# Patient Record
Sex: MALE | Race: BLACK OR AFRICAN AMERICAN
[De-identification: names, ages, dates, MRNs, and addresses within clinical notes are randomized per-mention and may not be internally consistent; named-entity substitution may affect disease eponyms.]

---

## 2018-06-13 NOTE — HISTORY & PHYSICAL PRE-OP
General Information and HPI
MD Statement:
I have seen and personally examined CARSON ROSALES and documented this
H&P.
 
The patient is a 62 year old M who presented with a patient stated chief 
complaint of bilateral hip pain radiating to bilateral anterior thighs with 
ambulation.
 
Source of Information: patient, old records
Exam Limitations: no limitations
History of Present Illness:
  Carson is a 62-year-old gentleman who has been complaining of 
progressively worsening bilateral hip pain that radiates to his anterior thighs 
bilaterally.  He states the pain is worse with ambulation with classic symptoms 
of neurogenic claudication.  He denies any significant low back pain nor any 
discomfort beyond his knees.  He does admit to weakness in bilateral lower 
extremities with quad buckling but no obvious numbness or tingling in his lower 
extremities.  He also denies any bowel or bladder involvement.  Carson 
describes his pain as a 7/10 in intensity with ambulation which is relieved with
rest.  Carson has tried conservative measures without relief.  Given his MRI
findings of L2-3 spinal stenosis with suspected pseudoarthrosis at the L4-5 
level, and his failure to respond to conservative measures, he wants nothing 
more to do with nonsurgical treatment.  He is being consented for a revision 
posterior lumbar decompression and fusion L2-S1 with instrumentation and iliac 
crest bone grafting on 2018.
 
Allergies/Medications
Allergies:
Coded Allergies:
No Known Allergies (18)
 
Home Med list
Acetaminophen (Tylenol Extra Strength) 500 MG TABLET   1 TAB PO TID PRN pain  (
Reported)
Allopurinol 300 MG TAB   1 TAB PO DAILY UNKNOWN  (Reported)
Aspirin (Ecotrin) 81 MG TABLET.DR   1 TAB PO DAILY BLOOD THINNER  (Reported)
Atenolol 50 MG TABLET   1 TAB PO DAILY BLOOD PRESSURE  (Reported)
Hydrochlorothiazide (Hydrodiuril 25 MG Tab) 25 MG TABLET   1 TAB PO DAILY BLOOD 
PRESSURE  (Reported)
Losartan (Cozaar) 100 MG TABLET   1 TAB PO DAILY BLOOD PRESSURE  (Reported)
Nifedipine (Adalat Cc) 30 MG TABLET.ER   1 TAB PO DAILY UNKNOWN  (Reported)
Sitagliptin Phos/Metformin HCl (Janumet  MG Tablet) 50 MG-500 MG TABLET   
1 TAB PO BID diabetes  (Reported)
 
Compliance With Home Meds: GOOD
 
Past History
 
Medical History
Blood Transfusion Hx: No
Neurological: NONE
EENT: NONE
Cardiovascular: hypertension
Respiratory: NONE
Gastrointestinal: NONE
Hepatic: NONE
Renal: NONE
Musculoskeletal: chronic back pain, disk herniation, degen joint disease, gout, 
osteoarthritis, sciatica, spinal stenosis
Psychiatric: NONE
Endocrine: diabetes
Blood Disorders: NONE
Cancer(s): NONE
GYN/Reproductive: NONE
History of MRSA: No
History of VRE: No
History of CDIFF: No
Isolation History: Standard
 
Surgical History
Pertinent Surgical History: laminectomy, spinal fusion, s/p PLDF L3-S1 with 
Instr. , s/p right shoulder arthroscopy , s/p colonoscopy
 
Past Family/Social History
 
Family History
Relations & Conditions if any
MOTHER (Alzheimer's DiseaseHypertensionDiabetes Mellitus). Age 85.
FATHER, , Age 74; Cause: Colon cancer.
SISTER (Mini Strokes(TIA)). Age 69.
 
 
Psychosocial History
Where Do You Live? Home
Who Do You Live With? parent
Services at Home None
Primary Language: English
Smoking Status: Former Smoker (quit 30 years ago)
ETOH Use: 1-2 scotch drinks a week
Illicit Drug Use: denies illicit drug use
Other Social History:
Single with no children.
 
Employment History
Employment: Employed
Profession/Employer: 
 
Review of Systems
Review of Systems:
Remarkable for the above complaints.
 
Exam & Diagnostic Data
Last 24 Hrs of Vital Signs/I&O
Height: 5' 9"
Weight: 280 lbs.
 
Physical Exam
General Appearance Alert, Oriented X3, Cooperative, No Acute Distress
Skin No Rashes, No Breakdown, No Significant Lesion
HEENT Atraumatic, PERRLA, EOMI, Mucous Membr. moist/pink
Neck Supple, No JVD, No thryomegaly, +2 Carotid Pulse wo Bruit
Lymphatic Cervical nl
Cardiovascular Regular Rate, Normal S1, Normal S2, No Murmurs
Lungs Clear to Auscultation
Abdomen Normal Bowel Sounds, Soft, No Tenderness
Neurological +2 SINAN. ACHILLES REFLEXES, ABSENT SINAN. PATELLAR REFLEXES, WEAK +3 
SINAN. QUADS, ATROPHY OF SINAN. QUADS
Extremities No Edema, Normal Pulses
Vascular Normal Pulses
 
Assessment/Plan
Assessment/Plan:
Assessment:  Severe spinal stenosis at L2-3 and pseudoarthrosis at L4-5. 
 
Plan: Carson is scheduled for a revision posterior lumbar decompression and 
fusion L2-S1 with instrumentation and iliac crest bone grafting on 2018.  
We discussed the procedure in full detail, as well as the pre-and postoperative 
course, follow-up care, and anticipated recovery.  We also discussed the do's 
and don'ts and postoperative discharge instructions.  We discussed the benefits,
alternatives, and risks, not to exclude, death, paralysis, infection, bleeding, 
continued pain, failure of the surgery, need for future surgery, DVT, vascular 
injury, CSF leak, etc., and given these risks, he still wishes to proceed.  He 
is scheduled to follow-up with Dr. Panchito Key for his preoperative 
clearance.  Any changes in this patient's plan is based on this patient's 
outpatient clinical presentation.
 
Copies To:
Alvin WORRELL,Raphael
 
Attending MD Review Statement
 
Attending Statement
Attending MD Statement: examined this patient, discuss w/resident/PA/NP, agreed 
w/resident/PA/NP, reviewed images

## 2018-06-28 ENCOUNTER — HOSPITAL ENCOUNTER (INPATIENT)
Dept: HOSPITAL 68 - SDA | Age: 63
LOS: 2 days | DRG: 460 | End: 2018-06-30
Attending: ORTHOPAEDIC SURGERY | Admitting: ORTHOPAEDIC SURGERY
Payer: COMMERCIAL

## 2018-06-28 VITALS — HEIGHT: 69 IN | BODY MASS INDEX: 41.32 KG/M2 | WEIGHT: 279 LBS

## 2018-06-28 VITALS — SYSTOLIC BLOOD PRESSURE: 136 MMHG | DIASTOLIC BLOOD PRESSURE: 90 MMHG

## 2018-06-28 VITALS — DIASTOLIC BLOOD PRESSURE: 90 MMHG | SYSTOLIC BLOOD PRESSURE: 150 MMHG

## 2018-06-28 DIAGNOSIS — Z87.891: ICD-10-CM

## 2018-06-28 DIAGNOSIS — Z79.84: ICD-10-CM

## 2018-06-28 DIAGNOSIS — M10.9: ICD-10-CM

## 2018-06-28 DIAGNOSIS — M96.0: ICD-10-CM
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
## 2018-06-28 DIAGNOSIS — J45.909: ICD-10-CM

## 2018-06-28 DIAGNOSIS — E11.9: ICD-10-CM

## 2018-06-28 DIAGNOSIS — M48.062: Primary | ICD-10-CM

## 2018-06-28 DIAGNOSIS — I10: ICD-10-CM

## 2018-06-28 LAB
ABSOLUTE GRANULOCYTE CT: 8.2 /CUMM (ref 1.4–6.5)
BASOPHILS # BLD: 0 /CUMM (ref 0–0.2)
BASOPHILS NFR BLD: 0.5 % (ref 0–2)
EOSINOPHIL # BLD: 0 /CUMM (ref 0–0.7)
EOSINOPHIL NFR BLD: 0.4 % (ref 0–5)
ERYTHROCYTE [DISTWIDTH] IN BLOOD BY AUTOMATED COUNT: 14.8 % (ref 11.5–14.5)
GRANULOCYTES NFR BLD: 82.5 % (ref 42.2–75.2)
HCT VFR BLD CALC: 42.8 % (ref 42–52)
LYMPHOCYTES # BLD: 1.4 /CUMM (ref 1.2–3.4)
MCH RBC QN AUTO: 31.1 PG (ref 27–31)
MCHC RBC AUTO-ENTMCNC: 34.6 G/DL (ref 33–37)
MCV RBC AUTO: 90 FL (ref 80–94)
MONOCYTES # BLD: 0.3 /CUMM (ref 0.1–0.6)
PLATELET # BLD: 237 /CUMM (ref 130–400)
PMV BLD AUTO: 9.2 FL (ref 7.4–10.4)
RED BLOOD CELL CT: 4.75 /CUMM (ref 4.7–6.1)
WBC # BLD AUTO: 10 /CUMM (ref 4.8–10.8)

## 2018-06-28 PROCEDURE — 0SG1071 FUSION OF 2 OR MORE LUMBAR VERTEBRAL JOINTS WITH AUTOLOGOUS TISSUE SUBSTITUTE, POSTERIOR APPROACH, POSTERIOR COLUMN, OPEN APPROACH: ICD-10-PCS | Performed by: ORTHOPAEDIC SURGERY

## 2018-06-28 PROCEDURE — 4A11X4G MONITORING OF PERIPHERAL NERVOUS ELECTRICAL ACTIVITY, INTRAOPERATIVE, EXTERNAL APPROACH: ICD-10-PCS | Performed by: ORTHOPAEDIC SURGERY

## 2018-06-28 PROCEDURE — C1713 ANCHOR/SCREW BN/BN,TIS/BN: HCPCS

## 2018-06-28 PROCEDURE — 0QP004Z REMOVAL OF INTERNAL FIXATION DEVICE FROM LUMBAR VERTEBRA, OPEN APPROACH: ICD-10-PCS | Performed by: ORTHOPAEDIC SURGERY

## 2018-06-28 PROCEDURE — 0SG3071 FUSION OF LUMBOSACRAL JOINT WITH AUTOLOGOUS TISSUE SUBSTITUTE, POSTERIOR APPROACH, POSTERIOR COLUMN, OPEN APPROACH: ICD-10-PCS | Performed by: ORTHOPAEDIC SURGERY

## 2018-06-28 NOTE — PN- ORTHOPEDIC
Subjective
Subjective:
patient feels well without complaints postop, pain controlled
 
Objective
Vital Signs and I&Os
Vital Signs
 
 
Date Time Temp Pulse Resp B/P B/P Pulse O2 O2 Flow FiO2
 
     Mean Ox Delivery Rate 
 
06/28 1313      92 Room Air Room Air 
 
06/28 1308 97.8 63 16 150/90  92 Room Air Room Air 
 
 
 Intake & Output
 
 
 06/28 1600 06/28 0800 06/28 0000 06/27 1600 06/27 0800 06/27 0000
 
Intake Total      
 
Output Total 850     
 
Balance -850     
 
       
 
Output, Urine 850     
 
Patient 279 lb   280 lb  
 
Weight      
 
Weight Reported by Patient     
 
Measurement      
 
Method      
 
 
lumbar spine - 
mild drinage in dressings -reinforced
motor 5/5 all muscle groups
sensory intact, distal pulses intact
calves soft bilaterally and distal pulses intact
 
Assessment/Plan
Assessment/Plan
63 y/o male with loose hardware and unonunion s/p
Lumbosacral inspection of fusion mass level of segmental hardware bilaterally L3
4 5 S1.  Laminectomies L3-S1.  Pedicle screw instrumentation L3-S1.  Fusion with
local autologous bone graft L3 to S1.
EBL @ 200cc
sitting in bed -drainage in into dressings -reinforced by nursing -appears to be
minimal and not worrisome
tolarating POs -had a full dinner without nausea or vomiting
voided without difficulty
OOB with nursing ambulation with assitance
 
 
Core Measures
 
Venous Thromboembolism
VTE Risk Factors No risk factors
No Mechanical VTE Prophylaxis d/t Surgical Contraindication
No VTE Pharm Prophylaxis d/t NA PharmProphylax ordered

## 2018-06-28 NOTE — OPERATIVE REPORT
Operative/Inv Procedure Report
Surgery Date: 06/28/18
Name of Procedure:
Lumbosacral inspection of fusion mass level of segmental hardware bilaterally L3
4 5 S1.  Laminectomies L3 4 4551.  Pedicle screw instrumentation L3 4 5 S1.  Use
of fluoroscopy.  Fusion with local autologous bone graft L3 to S1.
Pre-Operative Diagnosis:
Loose hardware nonunion lumbosacral spine
Post-Operative Diagnosis:
Nonunion L5-S1.
Estimated Blood Loss: 200
Surgeon/Assistant:
Alvin WORRELL,Raphael ACEVEDO
 
Anesthesia: general endotracheal tube
Monitors:
Neuro
 
Operative/Procedure Note
Note:
After adequate general anesthesia was achieved the patient was placed in the 
prone position.  Previous incision was entered sharply with the scalpel in the 
midline.  Dissection was carried over the dorsal elements with electrocautery 
and the deep retractors were placed.  The hardware was examined as was the 
fusion and there is evidence of failed hardware at a nonunion at L5-S1 
bilaterally.  The hardware was removed.  One screw was still found to be 
stripped at L5 on the right and was left in place.  The osteotome and 
instrumentation was used to create laminectomies removing some of the ostial 
calcified bone over the laminotomy the right side.  The bone was stored for 
fusion.  The screw holes were evaluated the whole at L3 on the left was found to
be soft and not used for instrumentation and screws were placed on the right 
side at L3 bilaterally at L4 on the left side at L5 and bilaterally at S1.  
Excellent purchase was achieved and all screws placed.  The rods were contoured 
into position and torqued tightened into the to a section of the screws.  The 
wound was copiously irrigated the bone graft was packed over the decorticated 
area from L3 to S1 laterally.  The wound was again irrigated a closure of the 
lumbar dorsal fashion subcutaneous tissue was performed with absorbable suture 
the skin was closed with staples.  Sterile dressings were applied.  [facility]

## 2018-06-28 NOTE — PATIENT DISCHARGE INSTRUCTIONS
Acute Coronary Syndrome
 
Inclusion Criteria
At DC or during hospital stay patient has or had the following:
ACS DIAGNOSIS No
 
Discharge Core Measures
Meds if any: Prescribed or Continued at Discharge
Meds if any: NOT Prescribed or Continued at Discharge
 
Congestive Heart Failure
 
Inclusion Criteria
At DC or during hospital stay patient has or had the following:
CHF DIAGNOSIS No
 
Discharge Core Measures
Meds if any: Prescribed or Continued at Discharge
Meds if any: NOT Prescribed or Continued at Discharge
 
Cerebrovascular accident
 
Inclusion Criteria
At DC or during hospital stay patient has or had the following:
CVA/TIA Diagnosis No
 
Discharge Core Measures
Meds if any: Prescribed or Continued at Discharge
Meds if any: NOT Prescribed or Continued at Discharge
 
Venous thromboembolism
 
Inclusion Criteria
VTE Diagnosis No
VTE Type NONE
VTE Confirmed by (Test) NONE
 
Discharge Core Measures
- Per Current guidelines, there needs to be overlap
- treatment for the first 5 days of Warfarin therapy.
- If discharged on Warfarin prior to 5 days of
- overlap therapy, the patient will need to be
- assessed for post discharge needs including
- *Post discharge parental anticoagulation
- *Warfarin and/or parental anticoagulation education
- *Follow up date to check INR post discharge
At least 5 days overlap therapy as Inpatient No
Meds if any: Prescribed or Continued at Discharge
Note: Overlap Therapy is Warfarin and Anticoagulant
Meds if any: NOT Prescribed or Continued at Discharge

## 2018-06-28 NOTE — CONS- MEDICAL
Angelia King MD 18 1416:
General Information and HPI
 
Consulting Request
Date of Consult: 18
Requested By:
Raphael Esquivel MD
 
Reason for Consult:
HTN and DM management
Source of Information: patient, old records
Exam Limitations: no limitations
History of Present Illness:
Patient is a 63 YO M with PMH significant for HTN, DM, spinal stenosis requiring
surgical intervention presented electively for lumbosacral laminectomy with 
pedicle screw instrumentation followed by local autologous bone grafting. 
 
Medicine is consulted for management of diabetes and hypertension 
postoperatively.  Reportedly never had any cardiac problems.
He quit smoking, socially drinks twice a week, no drugs of abuse
 
He had his shoulder surgery in 2016.
 
 
 
Allergies/Medications
Allergies:
Coded Allergies:
No Known Allergies (18)
 
Home Med List:
Acetaminophen (Tylenol Extra Strength) 500 MG TABLET   1 TAB PO TID PRN pain  (
Reported)
Acetaminophen (Tylenol Extra Strength) 500 MG TABLET   1 TAB PO TID PRN TEMP>101
Allopurinol 300 MG TAB   1 TAB PO DAILY UNKNOWN  (Reported)
Aspirin (Ecotrin) 81 MG TABLET.DR   1 TAB PO DAILY BLOOD THINNER  (Reported)
Atenolol 50 MG TABLET   1 TAB PO DAILY BLOOD PRESSURE  (Reported)
Bisacodyl (Dulcolax) 10 MG SUPP.RECT   1 SUP RC DAILY PRN CONSTIPATION
Calcium Carbonate/Vitamin D3 (Os-Ed 500+D3 Caplet) 500 MG-200 TABLET   1 TAB PO
BID BONE HEALTH
Cholecalciferol (Vitamin D3) 1,000 UNIT TABLET   1 TAB PO DAILY BONE HEALTH
Docusate Sodium (Colace) 100 MG CAPSULE   1 CAP PO BID PRN CONSTIPATION
Hydrochlorothiazide (Hydrodiuril 25 MG Tab) 25 MG TABLET   1 TAB PO DAILY BLOOD 
PRESSURE  (Reported)
Losartan (Cozaar) 100 MG TABLET   1 TAB PO DAILY BLOOD PRESSURE  (Reported)
Magnesium Hydroxide (Milk Of Magnesia) 400 MG/5 ML ORAL.SUSP   5 ML PO Q8P PRN 
CONSTIPATION
Metformin HCl 500 MG TABLET   1 TAB PO BID DIABETES  (Reported)
Multiple Vitamin (Multivitamins) 1 EACH TABLET   1 TAB PO DAILY GENERAL HEALTH
Nifedipine (Adalat Cc) 30 MG TABLET.ER   1 TAB PO DAILY UNKNOWN  (Reported)
Oxycodone HCl/Acetaminophen (Percocet 5-325 MG Tablet) 5 MG-325 MG TABLET   1-2 
TAB PO Q4-6 PRN PAIN
Sitagliptin Phos/Metformin HCl (Janumet  MG Tablet) 50 MG-500 MG TABLET   
1 TAB PO BID diabetes  (Reported)
 
Current Medications:
 Current Medications
 
 
  Sig/Kaden Start time  Last
 
Medication Dose Route Stop Time Status Admin
 
Acetaminophen 650 MG Q4P PRN  1145 AC 
 
  PO   
 
Acetaminophen 1,000 MG .STK-MED ONE  0709 DC 
 
  IV  0710  
 
Allopurinol 300 MG DAILY  09 AC 
 
  PO   
 
Aspirin Buffered 81 MG DAILY  09 AC 
 
  PO   
 
Atenolol 50 MG DAILY  09 AC 
 
  PO   
 
Bisacodyl 10 MG DAILY AS NEEDED PRN  1145 AC 
 
  MA   
 
Calcium 600 MG BID  2100 AC 
 
  PO   
 
Cefazolin Sodium 1,000 MG IQ8  1600 AC 
 
  IV  0801  1637
 
Cefazolin Sodium 2,000 MG ONCE  0000 NR 
 
  IV  2359  
 
Cholecalciferol 1,000 IU DAILY  09 AC 
 
  PO   
 
Docusate Sodium 100 MG TID  1400 AC 
 
  PO   1447
 
Fentanyl Citrate 250 MCG .STK-MED ONE  0708 DC 
 
  IM  0709  
 
Hydrochlorothiazide 25 MG DAILY  09 AC 
 
  PO   
 
Hydromorphone HCl 2 MG .STK-MED ONE  0707 DC 
 
  IM  0708  
 
Insulin Aspart 0 AT BEDTIME  2100 AC 
 
  SC   
 
Insulin Aspart 0 TIDAC  1200 AC 
 
  SC   1637
 
Lactated Ringer's 1,000 ML Q10H  1145 AC 
 
  IV   1446
 
Losartan Potassium 100 MG DAILY  09 AC 
 
  PO   
 
Magnesium Hydroxide 30 ML Q8P PRN  1145 AC 
 
  PO   
 
Metformin HCl 500 MG BID  2100 AC 
 
  PO   
 
Midazolam HCl 2 MG .STK-MED ONE  0708 DC 
 
  IM  0709  
 
Morphine Sulfate 1 MG Q3P PRN  1145 AC 
 
  IV   
 
Multivitamins 1 TAB DAILY  09 AC 
 
  PO   
 
Nifedipine 30 MG DAILY  0900 AC 
 
  PO   
 
Ondansetron HCl 4 MG Q6P PRN  1145 AC 
 
  IV   
 
Oxycodone/ 1 TAB Q4P PRN  1145 AC 
 
Acetaminophen  PO   
 
Oxycodone/ 2 TAB Q4P PRN  1145 AC 
 
Acetaminophen  PO   1637
 
Remifentanil 2 MG .STK-MED ONE  0708 DC 
 
  IV  0709  
 
Sitagliptin Phosphate 50 MG BID  2100 AC 
 
  PO   
 
Trimethobenzamide HCl 200 MG Q6P PRN  1145 AC 
 
  IM   
 
 
 
 
Review of Systems
 
Review of Systems
Constitutional:
Reports: see HPI. 
 
Past History
 
Medical History
Blood Transfusion Hx: No
Neurological: NONE
EENT: NONE
Cardiovascular: hypertension
Respiratory: NONE
Gastrointestinal: NONE
Hepatic: NONE
Renal: NONE
Musculoskeletal: chronic back pain, disk herniation, degen joint disease, gout, 
osteoarthritis, sciatica, spinal stenosis
Psychiatric: NONE
Endocrine: diabetes
Blood Disorders: NONE
Cancer(s): NONE
GYN/Reproductive: NONE
 
Surgical History
Surgical History: laminectomy, spinal fusion, s/p PLDF L3-S1 with Instr.  s/
p right shoulder arthroscopy 2016 s/p colonoscopy
 
Family History
Relations & Conditions If Any:
MOTHER (Alzheimer's DiseaseHypertensionDiabetes Mellitus). Age 85.
FATHER, , Age 74; Cause: Colon cancer.
SISTER (Mini Strokes(TIA)). Age 69.
 
 
Psychosocial History
Where Do You Live? Home
Who Do You Live With? parent
Services at Home: None
Primary Language: English
Smoking Status: Never Smoked (quit 30 years ago)
ETOH Use: 1-2 scotch drinks a week
Illicit Drug Use: denies illicit drug use
Other Social History:
Single with no children.
 
Functional Ability
ADLs
Independent: dressing, eating, toileting, bathing. 
IADLs
Independent: shopping, housework, finances, food prep, telephone, transportation
, medication admin. 
 
Employment History
Employment: Employed
Profession/Employer: 
 
Exam & Diagnostic Data
Last 24 Hrs of Vital Signs/I&O
 Vital Signs
 
 
Date Time Temp Pulse Resp B/P B/P Pulse O2 O2 Flow FiO2
 
     Mean Ox Delivery Rate 
 
 1806       Room Air  
 
 1313      92 Room Air Room Air 
 
 1308 97.8 63 16 150/90  92 Room Air Room Air 
 
 
 Intake & Output
 
 
  1600  0800  0000
 
Intake Total   
 
Output Total 850  
 
Balance -850  
 
    
 
Output, Urine 850  
 
Patient 126.552 kg  
 
Weight   
 
Weight Reported by Patient  
 
Measurement   
 
Method   
 
 
 
 
Physical Exam
General Appearance: well developed/nourished, no apparent distress, alert, awake
Head: atraumatic, normal appearance
Eyes:
Bilateral: normal appearance, PERRL, EOMI. 
Ears, Nose, Throat: normal pharynx, normal ENT inspection
Neck: normal inspection, supple
Cardiovascular: regular rate/rhythm, normal heart sounds
Peripheral Pulses:
2+ radial (R), 2+ radial (L)
Gastrointestinal: normal bowel sounds, soft, non-tender
Extremities: normal inspection, normal capillary refill
Neurologic/Psych: awake, alert, oriented x 3, normal mood/affect
Cranial Nerves: normal hearing, normal speech, PERRL
Last 24 Hrs of Labs/Olu:
 Laboratory Tests
 
18 1200:
CBC w Diff NO MAN DIFF REQ, RBC 4.75, MCV 90.0, MCH 31.1  H, MCHC 34.6, RDW 14.8
 H, MPV 9.2, Gran % 82.5  H, Lymphocytes % 14.0  L, Monocytes % 2.6, Eosinophils
% 0.4, Basophils % 0.5, Absolute Granulocytes 8.2  H, Absolute Lymphocytes 1.4, 
Absolute Monocytes 0.3, Absolute Eosinophils 0, Absolute Basophils 0
 
 
Assessment/Plan
Assessment/Plan
Patient is a 62-year-old male with history of diabetes, hypertension presented 
to [place] electively for laminectomy/bone grafting of lumbar spinal stenosis.  
He had a history of diabetes and on oral hypoglycemics started 10/metformin 50/
500 twice a day.  He takes for antihypertensives including no rales, hype 
hydrochlorothiazide, losartan and nifedipine.  His aspirin was on hold prior to 
surgery.
 
Problem list
1.  Status post Lumbosacral inspection of fusion mass level of segmental 
hardware bilaterally L3 4 5 S1.  Laminectomies L3-S1.  Pedicle screw 
instrumentation L3-S1.  Fusion with local autologous bone graft L3 to S1 - POD 0
2.  Diabetes mellitus on oral hypoglycemics
3.  Hypertension
4.  Gout
 
Plan
Continue postoperative care per surgery
Please avoid oral hypoglycemic agents while in the hospital
Start low-dose insulin sliding scale with meals, Accu-Cheks TIDAC/HS, Levemer 3 
units twice a day
Continue antihypertensives including atenolol, losartan, nifedipine HCTZ for now
Pain management per surgery
DVT prophylaxis all the time
 
Problem List:
 1. Gout
 
 2. Diabetes
 
 3. Hypertension
 
 
Consult Acknowledgment
- Thank you for your consult request.
 
 
Pedro Robin MD 18 [id number]:
Assessment/Plan
 
Consult Acknowledgment
- Thank you for your consult request.
 
Attending MD Review Statement
 
Attending Statement
Attending MD Statement: examined this patient, discuss w/resident/PA/NP, agreed 
w/resident/PA/NP, discussed with family, reviewed EMR data (avail), amended to 
note
Attending Assessment/Plan:
The patient is a 63 yo male with h/o HTN, DM2, asthma, and spinal stenosis and h
/o prior lumbar surgery who presented with h/o increasing back pain for elective
lumbosacral laminectomy. His BP and diabetes have been stable. He did well with 
surgery. The patient is feeling well post op other than typical LBP. No dyspnea,
chest pain or GI symptoms.
 
Physical Exam;
VS: T 97.8, P 63, R 16, /80, PO 92%
HEENT: eyes- PERRLA, EOMI
           steven- moist mucosa
Neck: no bruits/JVD
Chest: clear
Cor: RRR nl S1, S2 w/o murm
Abd: BS+, soft, NT
Ext: no edema, pulses 2+
Neuro: alert & oriented, non-focal exam
 
Labs/Tests- as above.
 
Impression/Plan:
#S/P Lumbosacral Fusion- did well with surgery.
Plan: As per surgery.
 
#HTN- BP stable.
Plan: Continue Nifedipine, HTCZ, Atenolol, & Losartan.
 
#DM2- blood sugars being followed.
Plan: Agree with sliding scale overage at present.
        Continuing oral agents.

## 2018-06-29 VITALS — SYSTOLIC BLOOD PRESSURE: 141 MMHG | DIASTOLIC BLOOD PRESSURE: 82 MMHG

## 2018-06-29 VITALS — SYSTOLIC BLOOD PRESSURE: 130 MMHG | DIASTOLIC BLOOD PRESSURE: 80 MMHG

## 2018-06-29 VITALS — DIASTOLIC BLOOD PRESSURE: 78 MMHG | SYSTOLIC BLOOD PRESSURE: 138 MMHG

## 2018-06-29 LAB
ABSOLUTE GRANULOCYTE CT: 6.5 /CUMM (ref 1.4–6.5)
BASOPHILS # BLD: 0 /CUMM (ref 0–0.2)
BASOPHILS NFR BLD: 0.3 % (ref 0–2)
EOSINOPHIL # BLD: 0.1 /CUMM (ref 0–0.7)
EOSINOPHIL NFR BLD: 0.8 % (ref 0–5)
ERYTHROCYTE [DISTWIDTH] IN BLOOD BY AUTOMATED COUNT: 14.8 % (ref 11.5–14.5)
GRANULOCYTES NFR BLD: 66.9 % (ref 42.2–75.2)
HCT VFR BLD CALC: 40.1 % (ref 42–52)
LYMPHOCYTES # BLD: 2 /CUMM (ref 1.2–3.4)
MCH RBC QN AUTO: 30.9 PG (ref 27–31)
MCHC RBC AUTO-ENTMCNC: 34 G/DL (ref 33–37)
MCV RBC AUTO: 90.7 FL (ref 80–94)
MONOCYTES # BLD: 1.2 /CUMM (ref 0.1–0.6)
PLATELET # BLD: 274 /CUMM (ref 130–400)
PMV BLD AUTO: 8.5 FL (ref 7.4–10.4)
RED BLOOD CELL CT: 4.43 /CUMM (ref 4.7–6.1)
WBC # BLD AUTO: 9.8 /CUMM (ref 4.8–10.8)

## 2018-06-29 NOTE — PN- MEDICINE CONSULT
Fernando WORRELL,Angelia 06/29/18 1046:
Assessment/PlanMedical Consult
 
Assessment/Plan
Assessment:
Patient is a 62-year-old male with history of diabetes, hypertension presented 
to [facility] electively for laminectomy/bone grafting of lumbar spinal stenosis.  
He had a history of diabetes and on oral hypoglycemics started 10/metformin 50/
500 twice a day.  He takes for antihypertensives including no rales, hype 
hydrochlorothiazide, losartan and nifedipine.  His aspirin was on hold prior to 
surgery.
 
Problem list
1.  Status post Lumbosacral inspection of fusion mass level of segmental 
hardware bilaterally L3 4 5 S1.  Laminectomies L3-S1.  Pedicle screw 
instrumentation L3-S1.  Fusion with local autologous bone graft L3 to S1 - POD 1
2.  Diabetes mellitus on oral hypoglycemics
3.  Hypertension
4.  Gout
 
Plan
Continue postoperative care per surgery
Please avoid oral hypoglycemic agents while in the hospital
Start low-dose insulin sliding scale with meals, Accu-Cheks TIDAC/HS, Levemer 3 
units twice a day
Continue antihypertensives including atenolol, losartan, nifedipine HCTZ for now
Pain management per surgery
DVT prophylaxis all the time
Plan:
AS ABOVE
Problem List:
 1. Diabetes
 
 2. Hypertension
 
 3. Gout
 
 
Subjective
Subjective:
seen at bedside. 
Appears stable. No overnight issues. 
 
Review of Systems
Constitutional:
Reports: see HPI. 
 
Objective
Last 24 Hrs of Vital Signs/I&O
 Vital Signs
 
 
Date Time Temp Pulse Resp B/P B/P Pulse O2 O2 Flow FiO2
 
     Mean Ox Delivery Rate 
 
06/29 0852  64  148/98     
 
06/29 0645 97.6 56 16 130/80  96   
 
06/28 2129 98.5 62 18 136/90  95   
 
06/28 1806       Room Air  
 
06/28 1600       Room Air  
 
06/28 1313      92 Room Air Room Air 
 
06/28 1308 97.8 63 16 150/90  92 Room Air Room Air 
 
 
 Intake & Output
 
 
 06/29 1600 06/29 0800 06/29 0000
 
Intake Total   2130
 
Output Total  1325 400
 
Balance  -1325 1730
 
    
 
Intake, IV   800
 
Intake, Oral   1330
 
Output, Urine  1325 400
 
 
 
 
Physical Exam
General Appearance: well developed/nourished, no apparent distress, alert, awake
, comfortable
Head: atraumatic, normal appearance
Ears, Nose, Throat: normal pharynx, normal ENT inspection
Neck: normal inspection, supple
Cardiovascular: regular rate/rhythm, normal S1, S2
Respiratory: normal breath sounds, chest non-tender, no respiratory distress
Peripheral Pulses:
2+ radial (R), 2+ radial (L)
Abdomen: normal bowel sounds, soft, non-tender
Current Medications:
 Current Medications
 
 
  Sig/Kaden Start time  Last
 
Medication Dose Route Stop Time Status Admin
 
Acetaminophen 650 MG Q4P PRN 06/28 1145 AC 
 
  PO   
 
Allopurinol 300 MG DAILY 06/29 0900 AC 06/29
 
  PO   0850
 
Aspirin Buffered 81 MG DAILY 06/29 0900 AC 06/29
 
  PO   0849
 
Atenolol 50 MG DAILY 06/29 0900 AC 06/29
 
  PO   0850
 
Bisacodyl 10 MG DAILY AS NEEDED PRN 06/28 1145 AC 
 
  ID   
 
Calcium 600 MG BID 06/28 2100 AC 06/29
 
  PO   0849
 
Cefazolin Sodium 1,000 MG IQ8 06/28 1600 DC 06/29
 
  IV 06/29 0801  0848
 
Cefazolin Sodium 2,000 MG ONCE 06/28 0000 DC 
 
  IV 06/28 2359  
 
Cholecalciferol 1,000 IU DAILY 06/29 0900 AC 06/29
 
  PO   0850
 
Docusate Sodium 100 MG TID 06/28 1400 AC 06/29
 
  PO   1543
 
Hydrochlorothiazide 25 MG DAILY 06/29 0900 AC 06/29
 
  PO   0852
 
Insulin Aspart 0 AT BEDTIME 06/28 2100 AC 
 
  SC   
 
Insulin Aspart 0 TIDAC 06/28 1200 AC 06/29
 
  SC   0849
 
Lactated Ringer's 1,000 ML Q10H 06/28 1145 AC 06/29
 
  IV   0848
 
Losartan Potassium 100 MG DAILY 06/29 0900 AC 06/29
 
  PO   0852
 
Magnesium Hydroxide 30 ML Q8P PRN 06/28 1145 AC 
 
  PO   
 
Metformin HCl 500 MG BID 06/28 2100 AC 06/29
 
  PO   0849
 
Morphine Sulfate 1 MG Q3P PRN 06/28 1145 AC 
 
  IV   
 
Multivitamins 1 TAB DAILY 06/29 0900 AC 06/29
 
  PO   0850
 
Nifedipine 30 MG DAILY 06/29 0900 AC 06/29
 
  PO   0849
 
Ondansetron HCl 4 MG Q6P PRN 06/28 1145 AC 
 
  IV   
 
Oxycodone/ 1 TAB Q4P PRN 06/28 1145 AC 
 
Acetaminophen  PO   
 
Oxycodone/ 2 TAB Q4P PRN 06/28 1145 AC 06/29
 
Acetaminophen  PO   1539
 
Patient Medication  1 ED ONE ONE 06/29 1345 DC 06/29
 
Teaching  ED 06/29 1346  1539
 
Sitagliptin Phosphate 50 MG BID 06/28 2100 AC 06/29
 
  PO   0849
 
Trimethobenzamide HCl 200 MG Q6P PRN 06/28 1145 AC 
 
  IM   
 
 
 
 
Results
Last 24 Hrs Lab/Olu Results:
 Laboratory Tests
 
06/29/18 0758:
CBC w Diff NO MAN DIFF REQ, RBC 4.43  L, MCV 90.7, MCH 30.9, MCHC 34.0, RDW 14.8
 H, MPV 8.5, Gran % 66.9, Lymphocytes % 20.2  L, Monocytes % 11.8  H, 
Eosinophils % 0.8, Basophils % 0.3, Absolute Granulocytes 6.5, Absolute 
Lymphocytes 2.0, Absolute Monocytes 1.2  H, Absolute Eosinophils 0.1, Absolute 
Basophils 0
 
 
 
Pedro Robin MD 06/29/18 1441:
Attending MD Review Statement
 
Attending Sign Off
Attending Cosign Statement:
I have: examined this patient, reviewed avalbl EMR data, discussd w/resident/PA/
NP, discussed mgmt plan w/jatinder, discussed mgmt plan w/pt, agreed w/resident/PA/NP
, amended to note. 
Other Findings:
The patient was seen and discussed with the resident. He is doing well post 
operatively. Agree with plan of care.

## 2018-06-29 NOTE — PN- ORTHOPEDIC
Subjective
Subjective:
Patient feeling better. No preop symptoms. + mild postop incisional pain. 
Ambulated with PT.  No fever/chills, N/V, SOB. Maggy. po. +Voiding and + flatus.  
Review of Systems:
Remarkable for the above complaints.
 
Objective
Vital Signs and I&Os
Vital Signs
 
 
Date Time Temp Pulse Resp B/P B/P Pulse O2 O2 Flow FiO2
 
     Mean Ox Delivery Rate 
 
06/29 0852  64  148/98     
 
06/29 0645 97.6 56 16 130/80  96   
 
06/28 2129 98.5 62 18 136/90  95   
 
06/28 1806       Room Air  
 
06/28 1600       Room Air  
 
06/28 1313      92 Room Air Room Air 
 
06/28 1308 97.8 63 16 150/90  92 Room Air Room Air 
 
 
 Intake & Output
 
 
 06/29 1600 06/29 0800 06/29 0000 06/28 1600 06/28 0800 06/28 0000
 
Intake Total   2130   
 
Output Total  1325 400 850  
 
Balance  -1325 1730 -850  
 
       
 
Intake, IV   800   
 
Intake, Oral   1330   
 
Output, Urine  1325 400 850  
 
Patient    279 lb  
 
Weight      
 
Weight    Reported by Patient  
 
Measurement      
 
Method      
 
 
 
 
Physical Exam
General Appearance: well developed/nourished, no apparent distress, alert, awake
, comfortable
Respiratory: normal breath sounds, no respiratory distress
Cardiovascular: regular rate/rhythm
Abdomen: normal bowel sounds, soft, non-tender
Back: Incision C/D/I. Dressing changed. + old blood present. No active bleeding 
noted. +edema around incision.
Neurologic/Psychiatric: Neurovascularly stable and intact with no new or 
worsening gross motor or sensory loss in rao. lower extremities.
Skin: intact
Current Medications:
 Current Medications
 
 
  Sig/Kaden Start time  Last
 
Medication Dose Route Stop Time Status Admin
 
Acetaminophen 650 MG Q4P PRN 06/28 1145 AC 
 
  PO   
 
Allopurinol 300 MG DAILY 06/29 0900 AC 06/29
 
  PO   0850
 
Aspirin Buffered 81 MG DAILY 06/29 0900 AC 06/29
 
  PO   0849
 
Atenolol 50 MG DAILY 06/29 0900 AC 06/29
 
  PO   0850
 
Bisacodyl 10 MG DAILY AS NEEDED PRN 06/28 1145 AC 
 
  DE   
 
Calcium 600 MG BID 06/28 2100 AC 06/29
 
  PO   0849
 
Cefazolin Sodium 1,000 MG IQ8 06/28 1600 DC 06/29
 
  IV 06/29 0801  0848
 
Cefazolin Sodium 2,000 MG ONCE 06/28 0000 DC 
 
  IV 06/28 2359  
 
Cholecalciferol 1,000 IU DAILY 06/29 0900 AC 06/29
 
  PO   0850
 
Docusate Sodium 100 MG TID 06/28 1400 AC 06/29
 
  PO   0849
 
Hydrochlorothiazide 25 MG DAILY 06/29 0900 AC 06/29
 
  PO   0852
 
Insulin Aspart 0 AT BEDTIME 06/28 2100 AC 
 
  SC   
 
Insulin Aspart 0 TIDAC 06/28 1200 AC 06/29
 
  SC   0849
 
Lactated Ringer's 1,000 ML Q10H 06/28 1145 AC 06/29
 
  IV   0848
 
Losartan Potassium 100 MG DAILY 06/29 0900 AC 06/29
 
  PO   0852
 
Magnesium Hydroxide 30 ML Q8P PRN 06/28 1145 AC 
 
  PO   
 
Metformin HCl 500 MG BID 06/28 2100 AC 06/29
 
  PO   0849
 
Morphine Sulfate 1 MG Q3P PRN 06/28 1145 AC 
 
  IV   
 
Multivitamins 1 TAB DAILY 06/29 0900 AC 06/29
 
  PO   0850
 
Nifedipine 30 MG DAILY 06/29 0900 AC 06/29
 
  PO   0849
 
Ondansetron HCl 4 MG Q6P PRN 06/28 1145 AC 
 
  IV   
 
Oxycodone/ 1 TAB Q4P PRN 06/28 1145 AC 
 
Acetaminophen  PO   
 
Oxycodone/ 2 TAB Q4P PRN 06/28 1145 AC 06/29
 
Acetaminophen  PO   0916
 
Sitagliptin Phosphate 50 MG BID 06/28 2100 AC 06/29
 
  PO   0849
 
Trimethobenzamide HCl 200 MG Q6P PRN 06/28 1145 AC 
 
  IM   
 
 
 
 
Results
Last 48 Hours of Labs:
 Laboratory Tests
 
 
 06/29 06/28
 
 0758 1200
 
Hematology  
 
  CBC w Diff NO MAN DIFF REQ NO MAN DIFF REQ
 
  WBC (4.8 - 10.8 /CUMM) 9.8 10.0
 
  RBC (4.70 - 6.10 /CUMM) 4.43  L 4.75
 
  Hgb (14.0 - 18.0 G/DL) 13.6  L 14.8
 
  Hct (42 - 52 %) 40.1  L 42.8
 
  MCV (80.0 - 94.0 FL) 90.7 90.0
 
  MCH (27.0 - 31.0 PG) 30.9 31.1  H
 
  MCHC (33.0 - 37.0 G/DL) 34.0 34.6
 
  RDW (11.5 - 14.5 %) 14.8  H 14.8  H
 
  Plt Count (130 - 400 /CUMM) 274 237
 
  MPV (7.4 - 10.4 FL) 8.5 9.2
 
  Gran % (42.2 - 75.2 %) 66.9 82.5  H
 
  Lymphocytes % (20.5 - 51.1 %) 20.2  L 14.0  L
 
  Monocytes % (1.7 - 9.3 %) 11.8  H 2.6
 
  Eosinophils % (0 - 5 %) 0.8 0.4
 
  Basophils % (0.0 - 2.0 %) 0.3 0.5
 
  Absolute Granulocytes (1.4 - 6.5 /CUMM) 6.5 8.2  H
 
  Absolute Lymphocytes (1.2 - 3.4 /CUMM) 2.0 1.4
 
  Absolute Monocytes (0.10 - 0.60 /CUMM) 1.2  H 0.3
 
  Absolute Eosinophils (0.0 - 0.7 /CUMM) 0.1 0
 
  Absolute Basophils (0.0 - 0.2 /CUMM) 0 0
 
 
 
 
Assessment/Plan
Assessment/Plan
Assessment: S/p Rev. PLDF L3-S1 with Instr./local bone
 
Plan: Continue po Percocet
D/C IV fluids
Ambulate with PT
Do's and Don'ts explained.
Will F/U in am
Possible D/C in am 
Problem List:
 1. Hypertension
 
 2. Diabetes
 
 3. Gout
 
 
Core Measures
 
Venous Thromboembolism
VTE Risk Factors No risk factors
No Mechanical VTE Prophylaxis d/t Surgical Contraindication
No VTE Pharm Prophylaxis d/t NA PharmProphylax ordered
 
Attending MD Review Statement
 
Attending Statement
Attending MD Statement: examined this patient, discuss w/resident/PA/NP, agreed 
w/resident/PA/NP

## 2018-06-30 VITALS — DIASTOLIC BLOOD PRESSURE: 86 MMHG | SYSTOLIC BLOOD PRESSURE: 146 MMHG

## 2018-06-30 VITALS — DIASTOLIC BLOOD PRESSURE: 81 MMHG | SYSTOLIC BLOOD PRESSURE: 155 MMHG

## 2018-06-30 NOTE — SURG SHORT-STAY <48HRS DIS SUM
Visit Information
 
Visit Dates
Admission Date:
06/28/18
 
Discharge Date:
06/30/2018
 
 
Surgical Short Stay DC Summary
Admission Diagnosis:
Severe spinal stenosis at L2-3 and pseudoarthrosis at L4-5. 
 
Final Diagnosis:
Same, s/p surgery
Procedure(s):
Lumbosacral inspection of fusion mass level of segmental hardware bilaterally L3
4 5 S1.  Laminectomies L3 4 4551.  Pedicle screw instrumentation L3 4 5 S1.  Use
of fluoroscopy.  Fusion with local autologous bone graft L3 to S1.
Summary/Significant Findings:
Pt underwent procedure as listed in Operative report on 6/28.  He tolerated the 
procedure well and was brought to the PACU in stable condition.
Over the next two days, he ambulated, his pain was controlled, he voided 
spontaneously and he tolerated his diet.  He was cleared by medicine and PT to 
be discharged to a hotel on 6/30.  All questions answered.
Condition at Discharge:
good
Discharge Disposition: home or self care
Discharge instructions provided to patient/family: Yes
Post discharge follow-up plan:
Scheduled with Dr Esquivel

## 2018-06-30 NOTE — PN- ATT ADDEND
Attending Addendum
Attending Brief Note
Patient seen and examined.  He sitting in a chair.  Postoperatively appears to 
be doing well. 
 Blood pressures 146/86, pulse is 73, breathing at 16-18 and MAXIMUM TEMPERATURE
of 98.5. 
 He is awake alert oriented, lungs are clear to auscultation, heart is S1-S2 
regular, abdomen is soft and his surgical site is dressed. 
This is a 62-year-old with diabetes, hypertension who is here is with spinal 
stenosis and status post lumbar surgery with fusion and laminectomy.  His blood 
pressure is well controlled on his current antihypertensive regimen and will 
likely be discharged today as per surgery.

## 2018-06-30 NOTE — PN- ORTHOPEDIC
Subjective
Subjective:
OOB in chair, no complaints overnight
ambulating withoug difficulty
pain is well controlled
tolerating diet
 
Objective
Vital Signs and I&Os
Vital Signs
 
 
Date Time Temp Pulse Resp B/P B/P Pulse O2 O2 Flow FiO2
 
     Mean Ox Delivery Rate 
 
06/30 0824  73  146/86     
 
06/30 0823  73  146/86     
 
06/30 0823  73  146/86     
 
06/30 0642 98.5 62 20 155/81  95 Room Air  
 
06/29 2226 98.3 68 20 141/82  96 Room Air  
 
06/29 1518 98.6 55 18 138/78  97   
 
06/29 0852  64  148/98     
 
 
 Intake & Output
 
 
 06/30 1600 06/30 0800 06/30 0000 06/29 1600 06/29 0800 06/29 0000
 
Intake Total   200  100 2130
 
Output Total   150  1625 400
 
Balance   50  -1525 1730
 
       
 
Intake, IV     100 800
 
Intake, Oral   200   1330
 
Output, Urine   150  1625 400
 
 
 
Physical Exam:
VSS,afebrile
Chest: clear bilaterally, no r/r/w, RRR
Abd: soft, good bs
Ext: Neurovascularly stable and intact, no new or worsening gross motor or 
sensory loss in rao. lower extremities, calves nontender bilaterally
Wd: dressing changed, staples intact, no drainage or erythema
 
Assessment/Plan
Assessment/Plan
61yo male pod 2 s/p L3-S1 revision fusion
 
dc planning
continue current pain meds
PT-WBAT
 
 
Core Measures
 
Venous Thromboembolism
VTE Risk Factors No risk factors
No Mechanical VTE Prophylaxis d/t Surgical Contraindication
No VTE Pharm Prophylaxis d/t NA PharmProphylax ordered